# Patient Record
Sex: MALE | Race: WHITE | NOT HISPANIC OR LATINO | URBAN - METROPOLITAN AREA
[De-identification: names, ages, dates, MRNs, and addresses within clinical notes are randomized per-mention and may not be internally consistent; named-entity substitution may affect disease eponyms.]

---

## 2025-01-04 ENCOUNTER — OFFICE VISIT (OUTPATIENT)
Dept: URGENT CARE | Age: 60
End: 2025-01-04
Payer: COMMERCIAL

## 2025-01-04 VITALS
TEMPERATURE: 98.1 F | RESPIRATION RATE: 14 BRPM | OXYGEN SATURATION: 97 % | SYSTOLIC BLOOD PRESSURE: 154 MMHG | HEART RATE: 76 BPM | DIASTOLIC BLOOD PRESSURE: 73 MMHG

## 2025-01-04 DIAGNOSIS — J06.9 UPPER RESPIRATORY TRACT INFECTION, UNSPECIFIED TYPE: Primary | ICD-10-CM

## 2025-01-04 DIAGNOSIS — H92.01 OTALGIA, RIGHT: ICD-10-CM

## 2025-01-04 PROCEDURE — 1036F TOBACCO NON-USER: CPT | Performed by: STUDENT IN AN ORGANIZED HEALTH CARE EDUCATION/TRAINING PROGRAM

## 2025-01-04 PROCEDURE — 99203 OFFICE O/P NEW LOW 30 MIN: CPT | Performed by: STUDENT IN AN ORGANIZED HEALTH CARE EDUCATION/TRAINING PROGRAM

## 2025-01-04 RX ORDER — DOXYCYCLINE 100 MG/1
100 CAPSULE ORAL 2 TIMES DAILY
Qty: 20 CAPSULE | Refills: 0 | Status: SHIPPED | OUTPATIENT
Start: 2025-01-04 | End: 2025-01-14

## 2025-01-04 RX ORDER — HYDROXYZINE HYDROCHLORIDE 10 MG/1
TABLET, FILM COATED ORAL
COMMUNITY
Start: 2023-07-11

## 2025-01-04 RX ORDER — METHYLPREDNISOLONE 4 MG/1
TABLET ORAL
Qty: 21 TABLET | Refills: 0 | Status: SHIPPED | OUTPATIENT
Start: 2025-01-04 | End: 2025-01-10

## 2025-01-04 ASSESSMENT — ENCOUNTER SYMPTOMS
SINUS PRESSURE: 1
HEADACHES: 1
SINUS PAIN: 1
FEVER: 0
COUGH: 0

## 2025-01-04 NOTE — PROGRESS NOTES
Subjective   Patient ID: Taurus Calixto is a 59 y.o. male. They present today with a chief complaint of Sinusitis, Headache, and Earache (Sick X 8 days. TD-MA).    History of Present Illness    Sinusitis  Associated symptoms: congestion, ear pain and headaches    Associated symptoms: no cough and no fever    Headache  Associated symptoms: congestion, ear pain and sinus pressure    Associated symptoms: no cough and no fever    Earache   Associated symptoms include headaches. Pertinent negatives include no coughing or ear discharge.     Patient presents to the urgent care for a chief complaint of right ear otalgia along with sinus pressure and congestion over the last week.  Patient does admit to previous URI no reported fevers chills vomiting or diarrhea no report of cough patient states has had a history of sinusitis, patient denies any trauma to the ear or any blood or discharge states that he also recently just got off a flight and his ear did not pop prompting visit to the urgent care  Past Medical History  Allergies as of 01/04/2025 - Reviewed 01/04/2025   Allergen Reaction Noted    Penicillins Other 04/10/2003    Prednisone Diarrhea 09/17/2010       (Not in a hospital admission)       History reviewed. No pertinent past medical history.    History reviewed. No pertinent surgical history.     reports that he has never smoked. He has never used smokeless tobacco.    Review of Systems  Review of Systems   Constitutional:  Negative for fever.   HENT:  Positive for congestion, ear pain, sinus pressure and sinus pain. Negative for ear discharge.    Respiratory:  Negative for cough.    Neurological:  Positive for headaches.                                  Objective    Vitals:    01/04/25 1405   BP: 154/73   Pulse: 76   Resp: 14   Temp: 36.7 °C (98.1 °F)   SpO2: 97%     No LMP for male patient.    Physical Exam  Vitals and nursing note reviewed.   Constitutional:       General: He is not in acute distress.      Appearance: Normal appearance. He is not ill-appearing, toxic-appearing or diaphoretic.   HENT:      Head: Normocephalic and atraumatic.      Right Ear: Tympanic membrane, ear canal and external ear normal. There is no impacted cerumen.      Left Ear: Tympanic membrane, ear canal and external ear normal. There is no impacted cerumen.      Nose: Congestion present.      Mouth/Throat:      Mouth: Mucous membranes are moist.   Cardiovascular:      Rate and Rhythm: Normal rate and regular rhythm.   Pulmonary:      Effort: Pulmonary effort is normal. No respiratory distress.      Breath sounds: Normal breath sounds.   Neurological:      General: No focal deficit present.      Mental Status: He is alert and oriented to person, place, and time.   Psychiatric:         Mood and Affect: Mood normal.         Behavior: Behavior normal.         Procedures    Point of Care Test & Imaging Results from this visit  No results found for this visit on 01/04/25.   No results found.    Diagnostic study results (if any) were reviewed by Fabio Goodwin PA-C.    Assessment/Plan   Allergies, medications, history, and pertinent labs/EKGs/Imaging reviewed by Fabio Goodwin PA-C.     Medical Decision Making  I did discuss with patient that ear pain may be due to sinuses or possible eustachian tube dysfunction, patient will be placed on doxycycline to cover for acute sinusitis will also place on Medrol Dosepak to cover for eustachian tube dysfunction, I did discuss with patient allergy with prednisone states that he has taken in the past without issues of diarrhea.  If no resolution or regression of symptoms and 7 to 10 days may return to urgent care for evaluation or follow-up with primary care, if any blood or discharge emanates from his right ear patient is to return to urgent care for evaluation that would be signs of ruptured TM.  Patient verbalized understanding is agreeable to plan discharge emergent care A+O x 4 stable condition  no signs of distress patient hearing intact    Orders and Diagnoses  Diagnoses and all orders for this visit:  Upper respiratory tract infection, unspecified type  -     doxycycline (Vibramycin) 100 mg capsule; Take 1 capsule (100 mg) by mouth 2 times a day for 10 days. Take with at least 8 ounces (large glass) of water, do not lie down for 30 minutes after  -     methylPREDNISolone (Medrol Dospak) 4 mg tablets; Take as directed on package.      Medical Admin Record      Patient disposition: Home    Electronically signed by Fabio Goodwin PA-C  2:15 PM

## 2025-08-02 ENCOUNTER — OFFICE VISIT (OUTPATIENT)
Dept: URGENT CARE | Age: 60
End: 2025-08-02
Payer: COMMERCIAL

## 2025-08-02 VITALS
DIASTOLIC BLOOD PRESSURE: 91 MMHG | OXYGEN SATURATION: 99 % | HEART RATE: 63 BPM | TEMPERATURE: 98.2 F | RESPIRATION RATE: 17 BRPM | SYSTOLIC BLOOD PRESSURE: 147 MMHG | WEIGHT: 200 LBS

## 2025-08-02 DIAGNOSIS — B02.9 HERPES ZOSTER WITHOUT COMPLICATION: Primary | ICD-10-CM

## 2025-08-02 RX ORDER — VALACYCLOVIR HYDROCHLORIDE 1 G/1
1000 TABLET, FILM COATED ORAL 3 TIMES DAILY
Qty: 21 TABLET | Refills: 0 | Status: SHIPPED | OUTPATIENT
Start: 2025-08-02 | End: 2025-08-09

## 2025-08-02 NOTE — PROGRESS NOTES
Subjective   Patient ID: Taurus Calixto is a 60 y.o. male. They present today with a chief complaint of Rash (X 6 days, itching, spreading x 2 days, denies pain).    History of Present Illness    Rash      Patient presents to urgent care for a itchy burning rash on right flank over the last several days approximately 6 no report of any exposure to irritants such as poison ivy poison oak or poison sumac patient has had shingles vaccine  Past Medical History  Allergies as of 08/02/2025 - Reviewed 08/02/2025   Allergen Reaction Noted    Other Other 04/10/2003    Penicillins Other 04/10/2003    Prednisone Diarrhea 09/17/2010       Prescriptions Prior to Admission[1]     Medical History[2]    Surgical History[3]     reports that he has never smoked. He has never used smokeless tobacco.    Review of Systems  Review of Systems   Skin:  Positive for rash.                                  Objective    Vitals:    08/02/25 0805   BP: (!) 147/91   Pulse: 63   Resp: 17   Temp: 36.8 °C (98.2 °F)   SpO2: 99%   Weight: 90.7 kg (200 lb)     No LMP for male patient.    Physical Exam  Vitals and nursing note reviewed.   Constitutional:       General: He is not in acute distress.     Appearance: Normal appearance. He is not ill-appearing, toxic-appearing or diaphoretic.     Cardiovascular:      Rate and Rhythm: Normal rate.   Pulmonary:      Effort: Pulmonary effort is normal. No respiratory distress.     Skin:     Findings: Rash present.      Comments: Erythematous rash of the right flank linear distribution vesicular     Neurological:      General: No focal deficit present.      Mental Status: He is alert and oriented to person, place, and time.     Psychiatric:         Mood and Affect: Mood normal.         Behavior: Behavior normal.         Procedures    Point of Care Test & Imaging Results from this visit  No results found for this visit on 08/02/25.   Imaging  No results found.    Cardiology, Vascular, and Other Imaging  No other  imaging results found for the past 2 days      Diagnostic study results (if any) were reviewed by Fabio Goodwin PA-C.    Assessment/Plan   Allergies, medications, history, and pertinent labs/EKGs/Imaging reviewed by Fabio Goodwin PA-C.     Medical Decision Making  I did discuss with patient believe rash is due to shingles mild presentation due to previous vaccination patient replaced on valacyclovir did discuss contagiousness.  May follow-up with primary care or urgent care if no resolution or regression of symptoms in 7 to 10 days discharged urgent care A+O stable condition no signs of distress    Orders and Diagnoses  Diagnoses and all orders for this visit:  Herpes zoster without complication  -     valACYclovir (Valtrex) 1 gram tablet; Take 1 tablet (1,000 mg) by mouth 3 times a day for 7 days.      Medical Admin Record      Patient disposition: Home    Electronically signed by Fabio Goodwin PA-C  8:17 AM           [1] (Not in a hospital admission)   [2] History reviewed. No pertinent past medical history.  [3] History reviewed. No pertinent surgical history.